# Patient Record
Sex: FEMALE | Race: OTHER | NOT HISPANIC OR LATINO | Employment: FULL TIME | URBAN - METROPOLITAN AREA
[De-identification: names, ages, dates, MRNs, and addresses within clinical notes are randomized per-mention and may not be internally consistent; named-entity substitution may affect disease eponyms.]

---

## 2022-01-22 ENCOUNTER — OFFICE VISIT (OUTPATIENT)
Dept: URGENT CARE | Facility: CLINIC | Age: 42
End: 2022-01-22
Payer: COMMERCIAL

## 2022-01-22 VITALS
SYSTOLIC BLOOD PRESSURE: 112 MMHG | HEART RATE: 60 BPM | TEMPERATURE: 100 F | HEIGHT: 66 IN | DIASTOLIC BLOOD PRESSURE: 52 MMHG | OXYGEN SATURATION: 100 % | WEIGHT: 148 LBS | RESPIRATION RATE: 18 BRPM | BODY MASS INDEX: 23.78 KG/M2

## 2022-01-22 DIAGNOSIS — U07.1 COVID-19 VIRUS INFECTION: Primary | ICD-10-CM

## 2022-01-22 DIAGNOSIS — Z11.52 ENCOUNTER FOR SCREENING FOR COVID-19: ICD-10-CM

## 2022-01-22 DIAGNOSIS — R05.9 COUGH: ICD-10-CM

## 2022-01-22 DIAGNOSIS — R11.0 NAUSEA: ICD-10-CM

## 2022-01-22 LAB
CTP QC/QA: YES
SARS-COV-2 RDRP RESP QL NAA+PROBE: POSITIVE

## 2022-01-22 PROCEDURE — 1160F RVW MEDS BY RX/DR IN RCRD: CPT | Mod: CPTII,S$GLB,, | Performed by: PHYSICIAN ASSISTANT

## 2022-01-22 PROCEDURE — U0002: ICD-10-PCS | Mod: QW,S$GLB,, | Performed by: PHYSICIAN ASSISTANT

## 2022-01-22 PROCEDURE — 1160F PR REVIEW ALL MEDS BY PRESCRIBER/CLIN PHARMACIST DOCUMENTED: ICD-10-PCS | Mod: CPTII,S$GLB,, | Performed by: PHYSICIAN ASSISTANT

## 2022-01-22 PROCEDURE — 3078F DIAST BP <80 MM HG: CPT | Mod: CPTII,S$GLB,, | Performed by: PHYSICIAN ASSISTANT

## 2022-01-22 PROCEDURE — 99203 OFFICE O/P NEW LOW 30 MIN: CPT | Mod: S$GLB,,, | Performed by: PHYSICIAN ASSISTANT

## 2022-01-22 PROCEDURE — 1159F MED LIST DOCD IN RCRD: CPT | Mod: CPTII,S$GLB,, | Performed by: PHYSICIAN ASSISTANT

## 2022-01-22 PROCEDURE — 1159F PR MEDICATION LIST DOCUMENTED IN MEDICAL RECORD: ICD-10-PCS | Mod: CPTII,S$GLB,, | Performed by: PHYSICIAN ASSISTANT

## 2022-01-22 PROCEDURE — 3074F SYST BP LT 130 MM HG: CPT | Mod: CPTII,S$GLB,, | Performed by: PHYSICIAN ASSISTANT

## 2022-01-22 PROCEDURE — S0119 ONDANSETRON 4 MG: HCPCS | Mod: S$GLB,,, | Performed by: EMERGENCY MEDICINE

## 2022-01-22 PROCEDURE — S0119 PR ONDANSETRON, ORAL, 4MG: ICD-10-PCS | Mod: S$GLB,,, | Performed by: EMERGENCY MEDICINE

## 2022-01-22 PROCEDURE — 3008F BODY MASS INDEX DOCD: CPT | Mod: CPTII,S$GLB,, | Performed by: PHYSICIAN ASSISTANT

## 2022-01-22 PROCEDURE — U0002 COVID-19 LAB TEST NON-CDC: HCPCS | Mod: QW,S$GLB,, | Performed by: PHYSICIAN ASSISTANT

## 2022-01-22 PROCEDURE — 3078F PR MOST RECENT DIASTOLIC BLOOD PRESSURE < 80 MM HG: ICD-10-PCS | Mod: CPTII,S$GLB,, | Performed by: PHYSICIAN ASSISTANT

## 2022-01-22 PROCEDURE — 3008F PR BODY MASS INDEX (BMI) DOCUMENTED: ICD-10-PCS | Mod: CPTII,S$GLB,, | Performed by: PHYSICIAN ASSISTANT

## 2022-01-22 PROCEDURE — 3074F PR MOST RECENT SYSTOLIC BLOOD PRESSURE < 130 MM HG: ICD-10-PCS | Mod: CPTII,S$GLB,, | Performed by: PHYSICIAN ASSISTANT

## 2022-01-22 PROCEDURE — 99203 PR OFFICE/OUTPT VISIT, NEW, LEVL III, 30-44 MIN: ICD-10-PCS | Mod: S$GLB,,, | Performed by: PHYSICIAN ASSISTANT

## 2022-01-22 RX ORDER — LEVOTHYROXINE SODIUM 100 UG/1
100 TABLET ORAL DAILY
COMMUNITY
Start: 2022-01-07

## 2022-01-22 RX ORDER — ONDANSETRON 8 MG/1
8 TABLET, ORALLY DISINTEGRATING ORAL
Status: COMPLETED | OUTPATIENT
Start: 2022-01-22 | End: 2022-01-22

## 2022-01-22 RX ADMIN — ONDANSETRON 8 MG: 8 TABLET, ORALLY DISINTEGRATING ORAL at 09:01

## 2022-01-22 NOTE — PATIENT INSTRUCTIONS
You have tested positive for COVID-19 today.      Please note that patients who test positive for COVID-19 are required by the CDC to undergo isolation for 5 days after their symptoms first began.   - If you tested positive and do not have symptoms, you must isolate for 5 days starting on the day of the positive test.   - If you tested positive and have symptoms, you must isolate for 5 days starting on the day of the first symptoms,  not the day of the positive test.    This is the most important part, both the CDC and the [facility] emphasize that you do not test out of isolation.  In fact, we do not retest if you were positive in the last 90 days.    After 5 days, if your symptoms have improved and you have not had fever on day 5, you can return to the community on day 6- NO TESTING REQUIRED!     After your 5 days of isolation are completed, the CDC recommends strict mask use for the first 5 days that you come out of isolation.     During quarantine:   · Separate yourself from other people and animals in your home.  · Call ahead before visiting your doctor.  · Wear a facemask.  · Cover your coughs and sneezes.  · Wash your hands often with soap and water; hand  can be used, too.  · Avoid sharing personal household items.  · Wipe down surfaces used daily.  · Monitor your symptoms. Seek prompt medical attention if your illness is worsening (e.g., difficulty breathing).   · Before seeking care, call your healthcare provider.  · If you have a medical emergency and need to call 911, notify the dispatch personnel that you have, or are being evaluated for COVID-19. If possible, put on a facemask before emergency medical services arrive.         CDC Testing and Quarantine Guidelines for household members, intimate partners, and caregivers in a home setting of a known-positive COVID-19 person:    ·     A 'close exposure' is defined as anyone who has had an exposure (masked or unmasked) to a known COVID -19 positive  person within 6 feet of someone for a cumulative total of 15 minutes or more over a 24-hour period.    ·     Vaccinated: patients who have been boosted or completed the primary series of Pfizer or Moderna vaccine within the last 6 months or completed the primary series of J&J vaccine within the last 2 months and/or had a positive test within 90 days   - do NOT need to quarantine after contact with someone who had COVID-19 unless they have symptoms.   - should get tested 3-5 days after their exposure (if they have not had a positive test within the last 90 days), even if they don't have symptoms and wear a mask indoors in public for 10 days following exposure or until their test result is negative on day 5.  - If you develop symptoms test and quarantine.    ·     Unvaccinated, or are more than six months out from their second mRNA dose (or more than 2 months after the J&J vaccine) and not yet boosted,  and/or NOT had a positive test within 90 days and meet 'close exposure'  - you are required by CDC guidelines to quarantine for at least 5 days from time of exposure followed by 5 days of strict masking. It is recommended, but not required to test after 5 days, unless you develop symptoms, in which case you should test at that time.  - If you do decide to test at 5 days and are asymptomatic, the risk is that if you test without symptoms (on Day 5 for example) and you are positive, your 5 day isolation begins on that day, and you turned your 5 day quarantine into 10 days.  - If your exposure does not meet the above definition, you can return to your normal daily activities to include social distancing, wearing a mask and frequent handwashing.       Close contacts should also follow these recommendations:  · Make sure that you understand and can help the patient follow their provider's instructions for medication(s) and care. You should help the patient with basic needs in the home and provide support for getting  groceries, prescriptions, and other personal needs.  · Monitor the patient's symptoms. If the patient is getting sicker, call his or her healthcare provider and tell them that the patient has laboratory-confirmed COVID-19. If the patient has a medical emergency and you need to call 911, notify the dispatch personnel that the patient has, or is being evaluated for COVID-19.  · Household members should stay in another room or be  from the patient. Household members should use a separate bedroom and bathroom, if available.  · Prohibit visitors.  · Household members should care for any pets in the home.  · Make sure that shared spaces in the home have good air flow, such as by an air conditioner or an opened window, weather permitting.  · Perform hand hygiene frequently. Wash your hands often with soap and water for at least 20 seconds or use an alcohol-based hand  (that contains > 60% alcohol) covering all surfaces of your hands and rubbing them together until they feel dry. Soap and water should be used preferentially.  · Avoid touching your eyes, nose, and mouth.  · The patient should wear a facemask. If the patient is not able to wear a facemask (for example, because it causes trouble breathing), caregivers should wear a mask when they are in the same room as the patient.  · Wear a disposable facemask and gloves when you touch or have contact with the patient's blood, stool, or body fluids, such as saliva, sputum, nasal mucus, vomit, urine.  ? Throw out disposable facemasks and gloves after using them. Do not reuse.  ? When removing personal protective equipment, first remove and dispose of gloves. Then, immediately clean your hands with soap and water or alcohol-based hand . Next, remove and dispose of facemask, and immediately clean your hands again with soap and water or alcohol-based hand .  · You should not share dishes, drinking glasses, cups, eating utensils, towels,  bedding, or other items with the patient. After the patient uses these items, you should wash them thoroughly (see below Wash laundry thoroughly).  · Clean all high-touch surfaces, such as counters, tabletops, doorknobs, bathroom fixtures, toilets, phones, keyboards, tablets, and bedside tables, every day. Also, clean any surfaces that may have blood, stool, or body fluids on them.  · Use a household cleaning spray or wipe, according to the label instructions. Labels contain instructions for safe and effective use of the cleaning product including precautions you should take when applying the product, such as wearing gloves and making sure you have good ventilation during use of the product.  · Wash laundry thoroughly.  ? Immediately remove and wash clothes or bedding that have blood, stool, or body fluids on them.  ? Wear disposable gloves while handling soiled items and keep soiled items away from your body. Clean your hands (with soap and water or an alcohol-based hand ) immediately after removing your gloves.  ? Read and follow directions on labels of laundry or clothing items and detergent. In general, using a normal laundry detergent according to washing machine instructions and dry thoroughly using the warmest temperatures recommended on the clothing label.  · Place all used disposable gloves, facemasks, and other contaminated items in a lined container before disposing of them with other household waste. Clean your hands (with soap and water or an alcohol-based hand ) immediately after handling these items. Soap and water should be used preferentially if hands are visibly dirty.  · Discuss any additional questions with your state or local health department or healthcare provider. Check available hours when contacting your local health department.            You must understand that you've received an Urgent Care treatment only and that you may be released before all your medical  "problems are known or treated. You, the patient, will arrange for follow up care as instructed. Follow up with your PCP or specialty clinic as directed within 2-5 days if not improved or as needed.  You can call [phone] to schedule an appointment with the appropriate provider.   If your condition worsens we recommend that you receive another evaluation at the emergency room immediately or contact your primary medical clinics after hours call service to discuss your concerns.   Please return here or go to the Emergency Department for any concerns or worsening of condition.           If you are considered "high risk" referral to the Emergency Use Authorization (EUA) infusion clinic may have been sent on your behalf.  This was discussed with you during your visit. You can expect to be called within the next 1-2 business days to discuss possible appointment to get the infusion. If you do not receive a call, you may call to schedule the infusion. Reach out to Tea at [phone].  The infusion clinic should be available by phone Mon-Fri 7:30 -4:30. Infusions are given Monday, Wednesday, and Friday as well as limited times on Saturday.     If we discussed the COVID surveillance/home monitoring program, you will also get a call from [facility] at the [place] or [facility] location to get a pulse oximeter to monitor your blood oxygen level.  This will be followed by a COVID surveillance team daily through [employer] (available on computer or through mobile grady).       "

## 2022-01-22 NOTE — PROGRESS NOTES
"Subjective:       Patient ID: Neeta Friend is a 41 y.o. female.    Vitals:  height is 5' 6" (1.676 m) and weight is 67.1 kg (148 lb). Her tympanic temperature is 99.5 °F (37.5 °C). Her blood pressure is 93/59 (abnormal) and her pulse is 60. Her respiration is 18 and oxygen saturation is 100%.     Chief Complaint: No chief complaint on file.      Patient presents with COVID concerns.  To members of her household are currently COVID positive.  Patient states that fevers/chills, cough and headache started last night.  Upon arriving to clinic patient felt nauseous and lightheaded.  Improved after she laid down and had Zofran and a glass of water.  Denies any nausea or vomiting at home.  States that she only had a coffee this morning before coming in to clinic.  No chest pain, dizziness, vision changes, palpitations, shortness of breath.    Cough  This is a new problem. The problem has been unchanged. The problem occurs constantly. The cough is non-productive. Associated symptoms include chills, ear pain, a fever, headaches, nasal congestion and postnasal drip. Pertinent negatives include no chest pain, ear congestion, heartburn, hemoptysis, myalgias, rash, rhinorrhea, sore throat, shortness of breath, sweats, weight loss or wheezing. Nothing aggravates the symptoms. Treatments tried: Night Quil  The treatment provided no relief.       Constitution: Positive for chills and fever.   HENT: Positive for ear pain and postnasal drip. Negative for sore throat.    Cardiovascular: Negative for chest pain.   Respiratory: Positive for cough. Negative for bloody sputum, shortness of breath and wheezing.    Gastrointestinal: Negative for heartburn.   Musculoskeletal: Negative for muscle ache.   Skin: Negative for rash.   Neurological: Positive for headaches.       Objective:      Physical Exam   Constitutional: She appears well-developed.  Non-toxic appearance. She does not appear ill. No distress.   HENT:   Head: " Normocephalic and atraumatic.   Ears:   Right Ear: External ear normal.   Left Ear: External ear normal.   Nose: Nose normal.   Mouth/Throat: Mucous membranes are moist. No oropharyngeal exudate or posterior oropharyngeal erythema. Oropharynx is clear.   Eyes: Conjunctivae and EOM are normal.   Neck: Neck supple.   Cardiovascular: Normal rate and regular rhythm.   Pulmonary/Chest: Effort normal and breath sounds normal. No respiratory distress.   Abdominal: Normal appearance.   Musculoskeletal: Normal range of motion.         General: Normal range of motion.   Neurological: no focal deficit. She is alert. She displays no weakness. Gait normal.   Skin: Skin is warm, dry, not diaphoretic, not pale and no rash.   Psychiatric: Her behavior is normal.         Results for orders placed or performed in visit on 01/22/22   POCT COVID-19 Rapid Screening   Result Value Ref Range    POC Rapid COVID Positive (A) Negative     Acceptable Yes        Assessment:       1. COVID-19 virus infection    2. Cough    3. Encounter for screening for COVID-19    4. Nausea          Plan:       - Rapid COVID-19 positive    - Covid Risk Score:0    Pt is considered low risk (score < 3) and therefore does not meet criteria for EUA infusion referral.    - Advised patient to stay home and self quarantine for 5 days from onset of symptoms, or 5 days from positive test if asymptomatic. Advised must be fever free for at least 24 hours to discontinue isolation.  - Rest and drink plenty of water today. If nausea continues contact clinic and we can send in some Zofran for her. Believe nausea and light headedness was likely due to dehydration.   -Tylenol as needed for fever control. OTC medications prn for cold symptoms.   - Strict ED precautions given for any emergent symptoms.    COVID-19 virus infection    Cough  -     POCT COVID-19 Rapid Screening    Encounter for screening for COVID-19  -     POCT COVID-19 Rapid Screening    Nausea  -      ondansetron disintegrating tablet 8 mg

## 2022-01-22 NOTE — LETTER
[id number] [facility] [address] ? [place], [address] ? Phone [phone] ? Fax             Return to Work/School    Patient: Neeta Friend  YOB: 1980   Date: 01/22/2022      To Whom It May Concern:     Neeta Friend was in contact with/seen in my office on 01/22/2022. COVID-19 is present in our communities across the [facility]. Not all patients are eligible or appropriate to be tested. In this situation, your employee meets the following criteria:     Neeta Friend has met the criteria for COVID-19 testing and has a POSITIVE result. She can return to work once they are asymptomatic for 24 hours without the use of fever reducing medications AND at least five days from the start of symptoms (or from the first positive result if they have no symptoms).  Patient does NOT need to be re-tested to return to work.        If you have any questions or concerns, or if I can be of further assistance, please do not hesitate to contact me.     Sincerely,    Kellie Farris PA-C

## 2023-07-16 ENCOUNTER — OFFICE VISIT (OUTPATIENT)
Dept: URGENT CARE | Facility: CLINIC | Age: 43
End: 2023-07-16
Payer: COMMERCIAL

## 2023-07-16 VITALS
SYSTOLIC BLOOD PRESSURE: 110 MMHG | OXYGEN SATURATION: 98 % | TEMPERATURE: 98 F | HEIGHT: 63 IN | HEART RATE: 69 BPM | WEIGHT: 151 LBS | BODY MASS INDEX: 26.75 KG/M2 | RESPIRATION RATE: 16 BRPM | DIASTOLIC BLOOD PRESSURE: 76 MMHG

## 2023-07-16 DIAGNOSIS — R39.11 URINARY HESITANCY: Primary | ICD-10-CM

## 2023-07-16 PROBLEM — N39.0 RECURRENT UTI: Status: ACTIVE | Noted: 2021-01-18

## 2023-07-16 PROBLEM — N39.0 URINARY TRACT INFECTION: Status: ACTIVE | Noted: 2020-02-24

## 2023-07-16 LAB
BILIRUB UR QL STRIP: NEGATIVE
GLUCOSE UR QL STRIP: NEGATIVE
KETONES UR QL STRIP: NEGATIVE
LEUKOCYTE ESTERASE UR QL STRIP: NEGATIVE
PH, POC UA: 7
POC BLOOD, URINE: NEGATIVE
POC NITRATES, URINE: NEGATIVE
PROT UR QL STRIP: NEGATIVE
SP GR UR STRIP: 1.01 (ref 1–1.03)
UROBILINOGEN UR STRIP-ACNC: NORMAL (ref 0.1–1.1)

## 2023-07-16 PROCEDURE — 99214 OFFICE O/P EST MOD 30 MIN: CPT | Mod: S$GLB,,, | Performed by: EMERGENCY MEDICINE

## 2023-07-16 PROCEDURE — 81003 POCT URINALYSIS, DIPSTICK, AUTOMATED, W/O SCOPE: ICD-10-PCS | Mod: QW,S$GLB,, | Performed by: EMERGENCY MEDICINE

## 2023-07-16 PROCEDURE — 87088 URINE BACTERIA CULTURE: CPT | Performed by: EMERGENCY MEDICINE

## 2023-07-16 PROCEDURE — 87086 URINE CULTURE/COLONY COUNT: CPT | Performed by: EMERGENCY MEDICINE

## 2023-07-16 PROCEDURE — 99214 PR OFFICE/OUTPT VISIT, EST, LEVL IV, 30-39 MIN: ICD-10-PCS | Mod: S$GLB,,, | Performed by: EMERGENCY MEDICINE

## 2023-07-16 PROCEDURE — 81003 URINALYSIS AUTO W/O SCOPE: CPT | Mod: QW,S$GLB,, | Performed by: EMERGENCY MEDICINE

## 2023-07-16 RX ORDER — NITROFURANTOIN 25; 75 MG/1; MG/1
CAPSULE ORAL
COMMUNITY
Start: 2023-07-10 | End: 2023-07-24

## 2023-07-16 RX ORDER — PHENAZOPYRIDINE HYDROCHLORIDE 100 MG/1
100 TABLET, FILM COATED ORAL 3 TIMES DAILY PRN
Qty: 6 TABLET | Refills: 0 | Status: SHIPPED | OUTPATIENT
Start: 2023-07-16 | End: 2023-07-18

## 2023-07-16 RX ORDER — SULFAMETHOXAZOLE AND TRIMETHOPRIM 800; 160 MG/1; MG/1
1 TABLET ORAL 2 TIMES DAILY
Qty: 10 TABLET | Refills: 0 | Status: SHIPPED | OUTPATIENT
Start: 2023-07-16 | End: 2023-07-21

## 2023-07-16 NOTE — PATIENT INSTRUCTIONS
Bactrim as prescribed for 5 days.  Take Pyridium for 2 days for relief of symptoms.  On day 3 if you still have symptoms present, please notify clinic.  A urine culture may need to be done at that time.    Consider drinking cranberry juice or taking cranberry tablets to prevent bacterial urinary tract infections.  Drink plenty of water and rest.      Patient Education       Acute Cystitis Discharge Instructions   About this topic   Acute cystitis is irritation of the bladder. It is often caused by germs getting into the urinary tract. The urinary tract includes the kidneys, ureters, bladder, and urethra. The urethra is a tube at the bottom of the bladder. Urine flows out of this tube. The germs enter the urethra and then spread in the bladder. These germs may cause an infection in the bladder or urinary tract. This condition may also be caused by irritation from things like bubble baths, sanitary pads, sex, certain drugs, or certain foods. This condition is more common if you are pregnant.         What care is needed at home?   Ask your doctor what you need to do when you go home. Make sure you ask questions if you do not understand what the doctor says.  For the first day or so, you may want to take an over-the-counter medicine, like phenazopyridine. This will help to numb your bladder. You will also not have the strong urge to urinate. This medicine causes your urine and tears to look orange. If you have kidney disease, talk to your doctor before taking this medicine.  To lower your chance of getting a UTI in the future, you can:  Drink extra fluids.  If you have sex, urinate right afterwards.  Apply a warm compress or warm water bottle to your lower belly to lessen pain.  Practice good hygiene. Wipe from front to back after going to the toilet.  Do not use scented tampons, soap, or toilet paper.  Keep your genital area clean. Wash daily with soap and water.  Take showers instead of tub baths.  Do not use douches  or genital hygiene sprays.  What follow-up care is needed?   Your doctor may ask you to make visits to the office to check on your progress. Be sure to keep these visits.  What drugs may be needed?   The doctor may order drugs to:  Help with pain or swelling  Help with the urge or need to pass urine often  Fight an infection  Will physical activity be limited?   Physical activities will not be limited. You may have to pass urine more often.  What changes to diet are needed?   Do not drink beer, wine, and mixed drinks (alcohol) or caffeine. These can bother the bladder.  Talk to your doctor about drinking cranberry juice or taking cranberry tablets.  What problems could happen?   Kidney infection  Blood stream infection or sepsis  Blood in the urine  When do I need to call the doctor?   You have very bad pain in your back, shoulder, or belly.  You have a fever of 100.4°F (38°C) or higher; shaking chills or sweats even though you are taking antibiotics.  You notice more blood in your urine.  Your signs get worse or do not improve within 24 hours of starting treatment.  You are not able to urinate for more than 8 hours.  Your signs come back after treatment has stopped.  Teach Back: Helping You Understand   The Teach Back Method helps you understand the information we are giving you. After you talk with the staff, tell them in your own words what you learned. This helps to make sure the staff has described each thing clearly. It also helps to explain things that may have been confusing. Before going home, make sure you can do these:  I can tell you about my condition.  I can tell you what are good fluids for me to drink and how often I should try to go to the bathroom.  I can tell you what I will do if I have a fever; chills; pain with passing urine; blood in my urine; or back, side, or belly pain.  Where can I learn more?   Better Health  Channel  https://www.betterhealth.day.gov.au/health/ConditionsAndTreatments/cystitis   NHS Choices  http://www.nhs.uk/conditions/Cystitis/Pages/Introduction.aspx   Last Reviewed Date   2021-06-09  Consumer Information Use and Disclaimer   This information is not specific medical advice and does not replace information you receive from your health care provider. This is only a brief summary of general information. It does NOT include all information about conditions, illnesses, injuries, tests, procedures, treatments, therapies, discharge instructions or life-style choices that may apply to you. You must talk with your health care provider for complete information about your health and treatment options. This information should not be used to decide whether or not to accept your health care providers advice, instructions or recommendations. Only your health care provider has the knowledge and training to provide advice that is right for you.  Copyright   Copyright © 2021 [employer] and its affiliates and/or licensors. All rights reserved.

## 2023-07-16 NOTE — PROGRESS NOTES
"Subjective:      Patient ID: Neeta Friend is a 42 y.o. female.    Vitals:  height is 5' 3" (1.6 m) and weight is 68.5 kg (151 lb). Her tympanic temperature is 98.2 °F (36.8 °C). Her blood pressure is 110/76 and her pulse is 69. Her respiration is 16 and oxygen saturation is 98%.     Chief Complaint: Urinary Frequency    41 y/o female presents to clinic with complaints of increased urinary frequency, burning with urination, and lower central abdominal pain. Patient states she noticed symptoms of lower abdominal pain and back pain about a week ago, but shortly resolved on its own.  Patient states in the last 2 days symptoms have increased to constant lower abdominal pain and frequent urination. Patient also states she has a burning sensation with urination that started yesterday. Denies any fever, discharge, blood in urine. Denies taking anything to relieve symptoms.  Patient known to get honeymoon cystitis.  Has been seen by a urologist for this in the past.  She is supposed to be taking a daily antibiotic but does not take it.    Urinary Frequency   This is a new problem. The current episode started gradual onset. The problem occurs every urination. The problem has been gradually worsening. The quality of the pain is described as burning and aching. The pain is at a severity of 5/10. The pain is mild. There has been no fever. She is Sexually active. There is No history of pyelonephritis. Associated symptoms include flank pain, frequency and urgency. Pertinent negatives include no behavior changes, chills, discharge, hematuria, hesitancy, nausea, possible pregnancy, sweats, vomiting, weight loss, bubble bath use, constipation, rash or withholding. She has tried nothing for the symptoms. The treatment provided no relief. Her past medical history is significant for recurrent UTIs. There is no history of catheterization, diabetes insipidus, diabetes mellitus, genitourinary reflux, hypertension, kidney stones, a single " kidney, STD, urinary stasis or a urological procedure.     Constitution: Negative for chills, sweating and fatigue.   Gastrointestinal:  Negative for nausea, vomiting and constipation.   Genitourinary:  Positive for dysuria, frequency, urgency, flank pain and history of kidney stones. Negative for urine decreased, bladder incontinence, hematuria, vaginal discharge, vaginal bleeding and vaginal odor.   Skin:  Negative for rash.    Objective:     Physical Exam   Constitutional: She is oriented to person, place, and time.  Non-toxic appearance. She does not appear ill. No distress.   HENT:   Head: Normocephalic and atraumatic.   Ears:   Right Ear: External ear normal.   Left Ear: External ear normal.   Nose: Nose normal.   Mouth/Throat: Mucous membranes are moist.   Eyes: Conjunctivae are normal. No scleral icterus.   Cardiovascular: Normal rate, regular rhythm, normal heart sounds and normal pulses.   Pulmonary/Chest: Effort normal and breath sounds normal. No stridor. No respiratory distress. She has no wheezes. She has no rhonchi. She has no rales. She exhibits no tenderness.   Abdominal: Normal appearance and bowel sounds are normal. She exhibits no distension and no mass. Soft. There is abdominal tenderness (hypogastric pain.). There is no rebound, no guarding, no left CVA tenderness and no right CVA tenderness. No hernia.   Neurological: She is alert and oriented to person, place, and time.   Skin: Skin is warm, dry and not diaphoretic.   Psychiatric: Her behavior is normal. Mood, judgment and thought content normal.   Nursing note and vitals reviewed.    Assessment:     1. Urinary hesitancy        Plan:       Urinary hesitancy  -     POCT Urinalysis, Dipstick, Automated, W/O Scope  -     CULTURE, URINE  -     sulfamethoxazole-trimethoprim 800-160mg (BACTRIM DS) 800-160 mg Tab; Take 1 tablet by mouth 2 (two) times daily. for 5 days  Dispense: 10 tablet; Refill: 0  -     phenazopyridine (PYRIDIUM) 100 MG tablet;  Take 1 tablet (100 mg total) by mouth 3 (three) times daily as needed for Pain.  Dispense: 6 tablet; Refill: 0          Medical Decision Making:   History:   I obtained history from: someone other than patient.       <> Summary of History: Patient's  give some history  Initial Assessment:   UTI symptoms  Differential Diagnosis:   Cystitis, pyelonephritis, vaginitis  Clinical Tests:   Lab Tests: Ordered and Reviewed       <> Summary of Lab: Urinalysis is normal  Urgent Care Management:  Urine culture being done.  We will start patient on Bactrim and pyridium for her symptoms.  She is advised only to do the 2 days of pyridium.  If she has symptoms on day 3, she is instructed to contact the clinic.  She is also advised that we will contact her with culture results.  She verbalizes understanding of and agreement with the plan

## 2023-07-16 NOTE — PROGRESS NOTES
Subjective:      Patient ID: Neeta Friend is a 42 y.o. female.    Vitals:  vitals were not taken for this visit.     Chief Complaint: Urinary Frequency    43 y/o female presents to clinic with complaints of increase urinary frequency, burning with urination, and lower abdominal pain. Patient states about a week ago she notice slight back and lower abdominal pain, but it wasn't long or intense. Patient states in the last 2 days symptoms have increased to constant lower abdominal pain and urinating very frequently. Patient also states she has a burning sensation with urination that started yesterday. Patient states she took Advil which didn't help relieve symptoms.     Urinary Frequency   This is a new problem. The current episode started gradual onset. The problem occurs every urination. The problem has been gradually worsening. The quality of the pain is described as burning and aching. The pain is at a severity of 5/10. The pain is mild. There has been no fever. She is Sexually active. There is No history of pyelonephritis. Associated symptoms include flank pain, frequency and urgency. Pertinent negatives include no behavior changes, chills, discharge, hematuria, hesitancy, nausea, possible pregnancy, sweats, vomiting, weight loss, bubble bath use, constipation, rash or withholding. She has tried NSAIDs for the symptoms. The treatment provided no relief. There is no history of catheterization, diabetes insipidus, diabetes mellitus, genitourinary reflux, hypertension, kidney stones, recurrent UTIs, a single kidney, STD, urinary stasis or a urological procedure.     Constitution: Negative for chills.   Gastrointestinal:  Negative for nausea, vomiting and constipation.   Genitourinary:  Positive for frequency, urgency and flank pain. Negative for hematuria.   Skin:  Negative for rash.    Objective:     Physical Exam    Assessment:     1. Urinary hesitancy        Plan:       Urinary hesitancy  -     POCT Urinalysis,  Dipstick, Automated, W/O Scope                     .

## 2023-07-18 LAB — BACTERIA UR CULT: ABNORMAL

## 2023-07-20 ENCOUNTER — TELEPHONE (OUTPATIENT)
Dept: URGENT CARE | Facility: CLINIC | Age: 43
End: 2023-07-20
Payer: COMMERCIAL

## 2023-07-20 NOTE — TELEPHONE ENCOUNTER
2nd attempt to contact patient to review urine culture results, NO answer. The phone continuously rang without an option to leave a VM.

## 2023-07-22 ENCOUNTER — TELEPHONE (OUTPATIENT)
Dept: URGENT CARE | Facility: CLINIC | Age: 43
End: 2023-07-22
Payer: COMMERCIAL

## 2023-07-22 NOTE — TELEPHONE ENCOUNTER
"3rd and final attempt to reach pt by phone to discuss test results. Says "call cannot be completed as dialed" after ringing. Will send certified letter to pt's address to inform him of available test results. Pt also has [employer] access, will send him a message via [employer].   "

## 2023-07-24 ENCOUNTER — TELEPHONE (OUTPATIENT)
Dept: URGENT CARE | Facility: CLINIC | Age: 43
End: 2023-07-24
Payer: COMMERCIAL

## 2023-07-24 DIAGNOSIS — N30.00 ACUTE CYSTITIS WITHOUT HEMATURIA: Primary | ICD-10-CM

## 2023-07-24 RX ORDER — CEPHALEXIN 500 MG/1
500 CAPSULE ORAL EVERY 12 HOURS
Qty: 14 CAPSULE | Refills: 0 | Status: SHIPPED | OUTPATIENT
Start: 2023-07-24 | End: 2023-07-31

## 2023-07-24 RX ORDER — CEPHALEXIN 500 MG/1
500 CAPSULE ORAL EVERY 6 HOURS
Qty: 28 CAPSULE | Refills: 0 | Status: SHIPPED | OUTPATIENT
Start: 2023-07-24 | End: 2023-07-24 | Stop reason: CLARIF

## 2023-10-16 PROBLEM — N39.0 RECURRENT UTI: Status: RESOLVED | Noted: 2021-01-18 | Resolved: 2023-10-16

## 2023-10-16 PROBLEM — N39.0 URINARY TRACT INFECTION: Status: RESOLVED | Noted: 2020-02-24 | Resolved: 2023-10-16

## 2024-09-13 ENCOUNTER — OFFICE VISIT (OUTPATIENT)
Dept: URGENT CARE | Facility: CLINIC | Age: 44
End: 2024-09-13
Payer: COMMERCIAL

## 2024-09-13 VITALS
OXYGEN SATURATION: 99 % | RESPIRATION RATE: 18 BRPM | TEMPERATURE: 98 F | BODY MASS INDEX: 28.55 KG/M2 | DIASTOLIC BLOOD PRESSURE: 66 MMHG | HEIGHT: 62 IN | SYSTOLIC BLOOD PRESSURE: 110 MMHG | HEART RATE: 70 BPM | WEIGHT: 155.13 LBS

## 2024-09-13 DIAGNOSIS — R35.0 URINARY FREQUENCY: ICD-10-CM

## 2024-09-13 DIAGNOSIS — Z11.3 SCREENING FOR STDS (SEXUALLY TRANSMITTED DISEASES): ICD-10-CM

## 2024-09-13 DIAGNOSIS — R30.0 DYSURIA: Primary | ICD-10-CM

## 2024-09-13 LAB
BILIRUBIN, UA POC OHS: NEGATIVE
BLOOD, UA POC OHS: NEGATIVE
CLARITY, UA POC OHS: CLEAR
COLOR, UA POC OHS: YELLOW
GLUCOSE, UA POC OHS: NEGATIVE
KETONES, UA POC OHS: NEGATIVE
LEUKOCYTES, UA POC OHS: NEGATIVE
NITRITE, UA POC OHS: NEGATIVE
PH, UA POC OHS: 6
PROTEIN, UA POC OHS: NEGATIVE
SPECIFIC GRAVITY, UA POC OHS: >=1.03
UROBILINOGEN, UA POC OHS: 0.2

## 2024-09-13 PROCEDURE — 87086 URINE CULTURE/COLONY COUNT: CPT | Performed by: PHYSICIAN ASSISTANT

## 2024-09-13 PROCEDURE — 99214 OFFICE O/P EST MOD 30 MIN: CPT | Mod: 25,S$GLB,, | Performed by: PHYSICIAN ASSISTANT

## 2024-09-13 PROCEDURE — 86593 SYPHILIS TEST NON-TREP QUANT: CPT | Performed by: PHYSICIAN ASSISTANT

## 2024-09-13 PROCEDURE — 87088 URINE BACTERIA CULTURE: CPT | Performed by: PHYSICIAN ASSISTANT

## 2024-09-13 PROCEDURE — 81514 NFCT DS BV&VAGINITIS DNA ALG: CPT | Performed by: PHYSICIAN ASSISTANT

## 2024-09-13 PROCEDURE — 87147 CULTURE TYPE IMMUNOLOGIC: CPT | Performed by: PHYSICIAN ASSISTANT

## 2024-09-13 PROCEDURE — 96372 THER/PROPH/DIAG INJ SC/IM: CPT | Mod: S$GLB,,, | Performed by: PHYSICIAN ASSISTANT

## 2024-09-13 PROCEDURE — 81003 URINALYSIS AUTO W/O SCOPE: CPT | Mod: QW,S$GLB,, | Performed by: PHYSICIAN ASSISTANT

## 2024-09-13 PROCEDURE — 87591 N.GONORRHOEAE DNA AMP PROB: CPT | Performed by: PHYSICIAN ASSISTANT

## 2024-09-13 RX ORDER — LIDOCAINE HYDROCHLORIDE 10 MG/ML
1 INJECTION, SOLUTION INFILTRATION; PERINEURAL
Status: COMPLETED | OUTPATIENT
Start: 2024-09-13 | End: 2024-09-13

## 2024-09-13 RX ORDER — CEFTRIAXONE 500 MG/1
500 INJECTION, POWDER, FOR SOLUTION INTRAMUSCULAR; INTRAVENOUS
Status: COMPLETED | OUTPATIENT
Start: 2024-09-13 | End: 2024-09-13

## 2024-09-13 RX ADMIN — LIDOCAINE HYDROCHLORIDE 1 ML: 10 INJECTION, SOLUTION INFILTRATION; PERINEURAL at 07:09

## 2024-09-13 RX ADMIN — CEFTRIAXONE 500 MG: 500 INJECTION, POWDER, FOR SOLUTION INTRAMUSCULAR; INTRAVENOUS at 07:09

## 2024-09-13 NOTE — PROGRESS NOTES
"Subjective:      Patient ID: Brianda Friend is a 44 y.o. female.    Vitals:  height is 5' 2" (1.575 m) and weight is 70.4 kg (155 lb 1.5 oz). Her oral temperature is 98.2 °F (36.8 °C). Her blood pressure is 110/66 and her pulse is 70. Her respiration is 18 and oxygen saturation is 99%.     Chief Complaint: Dysuria    Pt presents to the clinic today with dysuria, urinary frequency, and lower abdominal pain that has been intermittently for about 2 weeks.  Reports hx of recurrent UTI and kidney stones (s/p lithotripsy, remainder of stones are "small"). Pt is established with Urologist. Rx'ed Macrobid to take after intercourse. Increased to twice daily for 5 days when UTI symptoms started and had some relief but then symptoms returned.  Her  is also being seen with similar symptoms and has history of chronic prostatitis and UTI.  Her  reports history of gonorrhea and Trichomonas prior to their relationship.  Patient denies any personal history of STD.  Both deny any new partners.  Denies vaginal discharge, vaginal odor, vaginal bleeding, genital sores, hematuria, flank pain, fevers/chills.  She does report mild vaginal itching.  LMP 08/29/2024.    Dysuria   This is a recurrent problem. The current episode started 1 to 4 weeks ago. The problem occurs intermittently. The problem has been gradually worsening. The quality of the pain is described as aching and burning. The pain is at a severity of 7/10. The pain is moderate. There has been no fever. She is Sexually active. There is No history of pyelonephritis. Associated symptoms include frequency and nausea. Pertinent negatives include no behavior changes, chills, discharge, flank pain, hematuria, hesitancy, possible pregnancy, sweats, urgency, vomiting, weight loss, bubble bath use, constipation, rash or withholding. She has tried nothing for the symptoms. Her past medical history is significant for kidney stones and recurrent UTIs. There is no history of " catheterization, diabetes insipidus, diabetes mellitus, genitourinary reflux, hypertension, a single kidney, STD, urinary stasis or a urological procedure.       Constitution: Negative for chills, sweating and fever.   Gastrointestinal:  Positive for nausea. Negative for vomiting and constipation.   Genitourinary:  Positive for dysuria, frequency and history of kidney stones. Negative for urgency, flank pain, hematuria, vaginal pain, vaginal discharge, vaginal bleeding, vaginal odor and genital sore.   Musculoskeletal: Negative.    Skin:  Negative for rash.      Objective:     Physical Exam   Constitutional: She appears well-developed.  Non-toxic appearance. She does not appear ill. No distress.   HENT:   Head: Normocephalic and atraumatic.   Ears:   Right Ear: External ear normal.   Left Ear: External ear normal.   Nose: Nose normal.   Eyes: Conjunctivae and EOM are normal.   Neck: Neck supple.   Pulmonary/Chest: Effort normal and breath sounds normal.   Abdominal: Normal appearance. There is no guarding, no left CVA tenderness and no right CVA tenderness.   Genitourinary:         Comments: Deferred, patient opted to self swab     Musculoskeletal: Normal range of motion.         General: Normal range of motion.   Neurological: no focal deficit. She is alert. She displays no weakness. Gait normal.   Skin: Skin is warm, dry, not diaphoretic, not pale and no rash.   Psychiatric: Her behavior is normal.     Results for orders placed or performed in visit on 09/13/24   POCT Urinalysis(Instrument)   Result Value Ref Range    Color, POC UA Yellow Yellow, Straw, Colorless    Clarity, POC UA Clear Clear    Glucose, POC UA Negative Negative    Bilirubin, POC UA Negative Negative    Ketones, POC UA Negative Negative    Spec Grav POC UA >=1.030 1.005 - 1.030    Blood, POC UA Negative Negative    pH, POC UA 6.0 5.0 - 8.0    Protein, POC UA Negative Negative    Urobilinogen, POC UA 0.2 <=1.0    Nitrite, POC UA Negative Negative  "   WBC, POC UA Negative Negative         Assessment:     1. Dysuria    2. Screening for STDs (sexually transmitted diseases)    3. Urinary frequency        Plan:     I have reviewed the patients previous visits, labs and images to look for any trends or previous treatments.  Last urine culture on 11/8/23 showed" Beta hemolytic Streptococcus, group B 10,000-25,000 colony forming units per mL".  Already established with Urology. UA with no abnormal findings today. She already took 5 day course of Macrobid. Will send urine culture, STD testing and vaginosis screening to rule out infectious source of patient's symptoms.  Her partner is also being tested today.  Given Rocephin shot in clinic.  Agreeable to wait on further treatment until tests resulted. Advised to refrain from sexual activity until all results are completed and she and/or her partner treated appropriately.  Drink plenty of fluids.  Close follow-up if any new or worsening symptoms.    Dysuria  -     POCT Urinalysis(Instrument)  -     C. trachomatis/N. gonorrhoeae by AMP DNA  -     Vaginosis Screen by DNA Probe  -     Urine culture  -     cefTRIAXone injection 500 mg  -     LIDOcaine HCL 10 mg/ml (1%) injection 1 mL    Screening for STDs (sexually transmitted diseases)  -     C. trachomatis/N. gonorrhoeae by AMP DNA  -     Vaginosis Screen by DNA Probe  -     cefTRIAXone injection 500 mg  -     LIDOcaine HCL 10 mg/ml (1%) injection 1 mL  -     Treponema Pallidium Antibodies IgG, IgM; Future; Expected date: 09/13/2024    Urinary frequency  -     Urine culture  -     cefTRIAXone injection 500 mg  -     LIDOcaine HCL 10 mg/ml (1%) injection 1 mL                      "

## 2024-09-14 ENCOUNTER — PATIENT MESSAGE (OUTPATIENT)
Dept: URGENT CARE | Facility: CLINIC | Age: 44
End: 2024-09-14
Payer: COMMERCIAL

## 2024-09-14 ENCOUNTER — TELEPHONE (OUTPATIENT)
Dept: URGENT CARE | Facility: CLINIC | Age: 44
End: 2024-09-14
Payer: COMMERCIAL

## 2024-09-14 LAB — TREPONEMA PALLIDUM IGG+IGM AB [PRESENCE] IN SERUM OR PLASMA BY IMMUNOASSAY: NONREACTIVE

## 2024-09-14 NOTE — TELEPHONE ENCOUNTER
Called patient, patient answered, confirmed 2x patient identifiers, and reported to the patient that the provider is awaiting lab results to inform prescription plan. I relayed a message from the provider stating the patient could use over the counter AZOs or hydrate to mitigate symptoms. Patient acknowledged and call ended.     Patient Message 09/14/2024 2:31 PM:  Thank you for the time you spent with me last night. I wanted to follow up regarding my symptoms. While the discomfort during urination has improved, I am still experiencing pain in my bladder area. Could you please let me know if theres anything else I should be doing or if it would be possible to prescribe any additional medication to help with this issue? Thank you. Jose Friend [phone]

## 2024-09-15 LAB
BACTERIAL VAGINOSIS DNA: NEGATIVE
C TRACH DNA SPEC QL NAA+PROBE: NOT DETECTED
CANDIDA GLABRATA DNA: NEGATIVE
CANDIDA KRUSEI DNA: NEGATIVE
CANDIDA RRNA VAG QL PROBE: NEGATIVE
N GONORRHOEA DNA SPEC QL NAA+PROBE: NOT DETECTED
T VAGINALIS RRNA GENITAL QL PROBE: NEGATIVE

## 2024-09-16 LAB
BACTERIA UR CULT: ABNORMAL
BACTERIA UR CULT: ABNORMAL

## 2024-09-17 ENCOUNTER — TELEPHONE (OUTPATIENT)
Dept: URGENT CARE | Facility: CLINIC | Age: 44
End: 2024-09-17
Payer: COMMERCIAL

## 2024-09-17 DIAGNOSIS — N39.0 URINARY TRACT INFECTION WITHOUT HEMATURIA, SITE UNSPECIFIED: Primary | ICD-10-CM

## 2024-09-17 RX ORDER — CEPHALEXIN 500 MG/1
500 CAPSULE ORAL EVERY 12 HOURS
Qty: 14 CAPSULE | Refills: 0 | Status: SHIPPED | OUTPATIENT
Start: 2024-09-17 | End: 2024-09-24

## 2024-09-17 NOTE — TELEPHONE ENCOUNTER
Attempted to call regarding urine culture. Unable to leave message. No answer. Culture was positive for lactobacillus susceptible to PCN/ cephalosporin.  She did receive a rocephin injection at the time of visit. Wanted to see if symptoms have improved.

## 2024-09-17 NOTE — TELEPHONE ENCOUNTER
Patient called requesting urine culture results. Denies Dysuria but having urgency and frequency. Senses something doesn't feel right. Keflex sent to pharmacy based on culture results.